# Patient Record
Sex: FEMALE | Race: ASIAN | NOT HISPANIC OR LATINO | Employment: UNEMPLOYED | URBAN - METROPOLITAN AREA
[De-identification: names, ages, dates, MRNs, and addresses within clinical notes are randomized per-mention and may not be internally consistent; named-entity substitution may affect disease eponyms.]

---
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
## 2023-04-20 ENCOUNTER — PATIENT OUTREACH (OUTPATIENT)
Dept: CARE COORDINATION | Facility: CLINIC | Age: 18
End: 2023-04-20
Payer: COMMERCIAL

## 2023-05-04 ENCOUNTER — PATIENT OUTREACH (OUTPATIENT)
Dept: CARE COORDINATION | Facility: CLINIC | Age: 18
End: 2023-05-04
Payer: COMMERCIAL

## 2023-06-02 ENCOUNTER — OFFICE VISIT (OUTPATIENT)
Dept: PEDIATRICS | Facility: CLINIC | Age: 18
End: 2023-06-02
Payer: COMMERCIAL

## 2023-06-02 DIAGNOSIS — F41.9 ANXIETY AND DEPRESSION: ICD-10-CM

## 2023-06-02 DIAGNOSIS — F32.A ANXIETY AND DEPRESSION: ICD-10-CM

## 2023-06-02 DIAGNOSIS — Z22.7 LATENT TUBERCULOSIS: Primary | ICD-10-CM

## 2023-06-02 PROCEDURE — 99213 OFFICE O/P EST LOW 20 MIN: CPT | Performed by: PEDIATRICS

## 2023-06-02 RX ORDER — ISONIAZID 100 MG/1
TABLET ORAL
Qty: 36 TABLET | Refills: 0 | Status: SHIPPED | OUTPATIENT
Start: 2023-06-02 | End: 2024-02-17

## 2023-06-02 ASSESSMENT — PATIENT HEALTH QUESTIONNAIRE - PHQ9
SUM OF ALL RESPONSES TO PHQ QUESTIONS 1-9: 20
SUM OF ALL RESPONSES TO PHQ QUESTIONS 1-9: 20
10. IF YOU CHECKED OFF ANY PROBLEMS, HOW DIFFICULT HAVE THESE PROBLEMS MADE IT FOR YOU TO DO YOUR WORK, TAKE CARE OF THINGS AT HOME, OR GET ALONG WITH OTHER PEOPLE: VERY DIFFICULT

## 2023-06-02 NOTE — PROGRESS NOTES
Assessment & Plan     Latent tuberculosis  - isoniazid (NYDRAZID) 100 MG tablet; Take 3 tablet by mouth once a week for 12 weeks  - rifapentine (PRIFTIN) 150 MG tablet; Take 6 tablets by mouth once a week for 12 weeks  cxr negative, strongly encouraged patient to stay compliant     Spoke to patient at length about her depression and anxiety  - due to cultural issues and finanacial dependence on parents doesn't want to start medications, was seeing a therapist but stopped due to stigma,  Reviewed safety plan with patient, review suicide hotline, offered to speak to patient's parents but patient deferred    Depression Screening Follow Up        6/2/2023    10:41 AM   PHQ   PHQ-9 Total Score 20   Q9: Thoughts of better off dead/self-harm past 2 weeks Nearly every day   F/U: Thoughts of suicide or self-harm Yes   F/U: Self harm-plan Yes   F/U: Self-harm action Yes   F/U: Safety concerns No          Aisha Mohan MD  [facility]   Giuseppe is a 18 year old, presenting for the following health issues:  Results (Positive quantiferon results, 2/1/22)        6/2/2023    10:49 AM   Additional Questions   Roomed by olive   Accompanied by self         6/2/2023    10:49 AM   Patient Reported Additional Medications   Patient reports taking the following new medications none     History of Present Illness       Reason for visit:  Tb  Symptoms include:  None    She eats 0-1 servings of fruits and vegetables daily.She consumes 0 sweetened beverage(s) daily.She exercises with enough effort to increase her heart rate 9 or less minutes per day.  She exercises with enough effort to increase her heart rate 3 or less days per week.   She is taking medications regularly.    Today's PHQ-9         PHQ-9 Total Score: 20    PHQ-9 Q9 Thoughts of better off dead/self-harm past 2 weeks :   Nearly every day  Thoughts of suicide or self harm: (P) Yes  Self-harm Plan:   (P) Yes  Self-harm Action:     (P)  Yes  Safety concerns for self or others: (P) No    How difficult have these problems made it for you to do your work, take care of things at home, or get along with other people: Very difficult  + self harm, spoke to patient at length about depression screen, cultural stigma on mental health conditions, planning to talk to her family about restarting therapy services     + latent tb - cxr negative, [place], but travels to [place] every few years and stays for one month, doesn't know if she was in contact with someone with active TB, never been homeless or been in [place]  Objective    LMP  (LMP Unknown)   There is no height or weight on file to calculate BMI.  Physical Exam   GENERAL: healthy, alert and no distress  NECK: no adenopathy, no asymmetry, masses, or scars and thyroid normal to palpation  RESP: lungs clear to auscultation - no rales, rhonchi or wheezes  CV: regular rate and rhythm, normal S1 S2, no S3 or S4, no murmur, click or rub, no peripheral edema and peripheral pulses strong

## 2023-06-21 ENCOUNTER — OFFICE VISIT (OUTPATIENT)
Dept: PEDIATRICS | Facility: CLINIC | Age: 18
End: 2023-06-21
Payer: COMMERCIAL

## 2023-06-21 VITALS
RESPIRATION RATE: 18 BRPM | OXYGEN SATURATION: 98 % | BODY MASS INDEX: 21.89 KG/M2 | WEIGHT: 128.2 LBS | TEMPERATURE: 98.1 F | HEART RATE: 84 BPM | HEIGHT: 64 IN | DIASTOLIC BLOOD PRESSURE: 75 MMHG | SYSTOLIC BLOOD PRESSURE: 115 MMHG

## 2023-06-21 DIAGNOSIS — R35.0 URINARY FREQUENCY: ICD-10-CM

## 2023-06-21 DIAGNOSIS — N92.6 IRREGULAR PERIODS: Primary | ICD-10-CM

## 2023-06-21 LAB
ALBUMIN UR-MCNC: ABNORMAL MG/DL
AMORPH CRY #/AREA URNS HPF: ABNORMAL /HPF
APPEARANCE UR: CLEAR
BACTERIA #/AREA URNS HPF: ABNORMAL /HPF
BILIRUB UR QL STRIP: ABNORMAL
C TRACH DNA SPEC QL PROBE+SIG AMP: NEGATIVE
COLOR UR AUTO: YELLOW
GLUCOSE UR STRIP-MCNC: NEGATIVE MG/DL
HCG UR QL: NEGATIVE
HGB UR QL STRIP: ABNORMAL
KETONES UR STRIP-MCNC: ABNORMAL MG/DL
LEUKOCYTE ESTERASE UR QL STRIP: NEGATIVE
N GONORRHOEA DNA SPEC QL NAA+PROBE: NEGATIVE
NITRATE UR QL: NEGATIVE
PH UR STRIP: 5.5 [PH] (ref 5–7)
RBC #/AREA URNS AUTO: ABNORMAL /HPF
SP GR UR STRIP: >=1.03 (ref 1–1.03)
SQUAMOUS #/AREA URNS AUTO: ABNORMAL /LPF
UROBILINOGEN UR STRIP-ACNC: 0.2 E.U./DL
WBC #/AREA URNS AUTO: ABNORMAL /HPF

## 2023-06-21 PROCEDURE — 99213 OFFICE O/P EST LOW 20 MIN: CPT | Performed by: PEDIATRICS

## 2023-06-21 PROCEDURE — 87591 N.GONORRHOEAE DNA AMP PROB: CPT | Performed by: PEDIATRICS

## 2023-06-21 PROCEDURE — 87491 CHLMYD TRACH DNA AMP PROBE: CPT | Performed by: PEDIATRICS

## 2023-06-21 PROCEDURE — 81025 URINE PREGNANCY TEST: CPT | Performed by: PEDIATRICS

## 2023-06-21 PROCEDURE — 81001 URINALYSIS AUTO W/SCOPE: CPT | Performed by: PEDIATRICS

## 2023-06-21 RX ORDER — NORETHINDRONE ACETATE AND ETHINYL ESTRADIOL 1MG-20(21)
1 KIT ORAL DAILY
Qty: 30 TABLET | Refills: 3 | Status: SHIPPED | OUTPATIENT
Start: 2023-06-21 | End: 2023-08-28

## 2023-06-21 ASSESSMENT — PAIN SCALES - GENERAL: PAINLEVEL: NO PAIN (0)

## 2023-06-21 NOTE — PROGRESS NOTES
Assessment & Plan     Urinary frequency  ua negative, maybe more pressure like pain when patient was initially constipated  Discolored urine is a side effect of TB medication    Irregular periods  - UA reflex to Microscopic - lab collect; Future  - HCG qualitative urine; Future  - Chlamydia & Gonorrhea by PCR, [facility] - Clinic Collect  - norethindrone-ethinyl estradiol (JUNEL FE 1/20) 1-20 MG-MCG tablet; Take 1 tablet by mouth daily for 120 days  - UA reflex to Microscopic - lab collect  - HCG qualitative urine  - Urine Microscopic Exam  If uhcg negative, can start birth control today    Aisha Mohan MD  [facility]   Giuseppe is a 18 year old, presenting for the following health issues:  UTI and Contraception        6/21/2023     8:39 AM   Additional Questions   Roomed by Qian   Accompanied by Self     UTI  This is a chronic problem. The current episode started more than 1 year ago. The problem occurs constantly. The problem has been unchanged. She has tried drinking for the symptoms. The treatment provided no relief.   Contraception    History of Present Illness       Reason for visit:  Bladder issues n bc  Symptom onset:  More than a month  Symptoms include:  Orange urine, pain with urine  Symptom progression:  Staying the same  Had these symptoms before:  Yes    She eats 0-1 servings of fruits and vegetables daily.She consumes 0 sweetened beverage(s) daily.She exercises with enough effort to increase her heart rate 20 to 29 minutes per day.  She exercises with enough effort to increase her heart rate 3 or less days per week.   She is taking medications regularly.       History of constipation, was due to iron supplements so stopped that, constipation has improved, improved  urinary frequency now  4-5 times a day but was more like 8-10 times a day, occ burning, occ urgency, no incontinence   Lmp: May 31st  Has been on birth control before for period regulation - bleeding  "too much - lasting for month at a time when she initially had period when she was 9, was on hormonal contraception at that time, restarted birth control when she was 15, now is sexually active , 1 partner, would like to start birth control         Objective    /75 (BP Location: Left arm, Patient Position: Sitting, Cuff Size: Child)   Pulse 84   Temp 98.1  F (36.7  C) (Oral)   Resp 18   Ht 1.622 m (5' 3.86\")   Wt 58.2 kg (128 lb 3.2 oz)   LMP 05/31/2023   SpO2 98%   BMI 22.10 kg/m    Body mass index is 22.1 kg/m .  Physical Exam   GENERAL: healthy, alert and no distress  NECK: no adenopathy, no asymmetry, masses, or scars and thyroid normal to palpation  RESP: lungs clear to auscultation - no rales, rhonchi or wheezes  CV: regular rate and rhythm, normal S1 S2, no S3 or S4, no murmur, click or rub, no peripheral edema and peripheral pulses strong  ABDOMEN: soft, nontender, no hepatosplenomegaly, no masses and bowel sounds normal        "

## 2023-07-30 ENCOUNTER — HEALTH MAINTENANCE LETTER (OUTPATIENT)
Age: 18
End: 2023-07-30

## 2023-08-24 NOTE — PATIENT INSTRUCTIONS
If you have any questions regarding your visit, Please contact your care team.     [employer]: [phone]    To Schedule an Appointment 24/7  Call: [phone] TELEPH[facility]     Vincent Howard MD  Medical Director    Domingo Hinton-RUCHI Pedroza-Surgery Scheduler  [place]-Surgery Scheduler               Tuesday-[place]  7:30 a.m-4:30 p.m    Thursday-[place]  7:30 a.m-4:30 p.m    Typical Surgery Days: Tuesday or Friday [facility] [place]  [address] [place] [address]  PH: [phone]     Imaging Scheduling all locations  PH: [phone]     [facility] Labor and Delivery  [address] [place] [address] [place] [address]  PH: [phone]    [facility]  [address] [place] [address]  PH: [phone] Fax      **Surgeries** Our Surgery Schedulers will contact you to schedule. If you do not receive a call within 3 business days, please call [phone].    Urgent Care locations:  [facility] Monday-Friday  10 am - 8 pm  Saturday and Sunday   9 am - 5 pm  Monday-Friday   10 am - 8 pm  Saturday and Sunday   9 am - 5 pm   [phone]   If you need a medication refill, please contact your pharmacy. Please allow 3 business days for your refill to be completed.  As always, Thank you for trusting us with your healthcare needs!    see additional instructions from your care team below

## 2023-08-28 ENCOUNTER — OFFICE VISIT (OUTPATIENT)
Dept: OBGYN | Facility: CLINIC | Age: 18
End: 2023-08-28
Payer: COMMERCIAL

## 2023-08-28 VITALS
BODY MASS INDEX: 23.41 KG/M2 | HEART RATE: 87 BPM | WEIGHT: 135.8 LBS | OXYGEN SATURATION: 99 % | SYSTOLIC BLOOD PRESSURE: 119 MMHG | DIASTOLIC BLOOD PRESSURE: 79 MMHG

## 2023-08-28 DIAGNOSIS — N92.6 ABNORMAL MENSTRUAL CYCLE: Primary | ICD-10-CM

## 2023-08-28 LAB
ESTRADIOL SERPL-MCNC: 68 PG/ML
FSH SERPL IRP2-ACNC: 4.2 MIU/ML
LH SERPL-ACNC: 8.3 MIU/ML
PROGEST SERPL-MCNC: 4.5 NG/ML
PROLACTIN SERPL 3RD IS-MCNC: 34 NG/ML (ref 5–23)
SHBG SERPL-SCNC: 39 NMOL/L (ref 30–135)
TSH SERPL DL<=0.005 MIU/L-ACNC: 2.21 UIU/ML (ref 0.3–4.2)

## 2023-08-28 PROCEDURE — 84443 ASSAY THYROID STIM HORMONE: CPT | Performed by: OBSTETRICS & GYNECOLOGY

## 2023-08-28 PROCEDURE — 83001 ASSAY OF GONADOTROPIN (FSH): CPT | Performed by: OBSTETRICS & GYNECOLOGY

## 2023-08-28 PROCEDURE — 84146 ASSAY OF PROLACTIN: CPT | Performed by: OBSTETRICS & GYNECOLOGY

## 2023-08-28 PROCEDURE — 99204 OFFICE O/P NEW MOD 45 MIN: CPT | Performed by: OBSTETRICS & GYNECOLOGY

## 2023-08-28 PROCEDURE — 84270 ASSAY OF SEX HORMONE GLOBUL: CPT | Performed by: OBSTETRICS & GYNECOLOGY

## 2023-08-28 PROCEDURE — 83002 ASSAY OF GONADOTROPIN (LH): CPT | Performed by: OBSTETRICS & GYNECOLOGY

## 2023-08-28 PROCEDURE — 82670 ASSAY OF TOTAL ESTRADIOL: CPT | Performed by: OBSTETRICS & GYNECOLOGY

## 2023-08-28 PROCEDURE — 36415 COLL VENOUS BLD VENIPUNCTURE: CPT | Performed by: OBSTETRICS & GYNECOLOGY

## 2023-08-28 PROCEDURE — 84144 ASSAY OF PROGESTERONE: CPT | Performed by: OBSTETRICS & GYNECOLOGY

## 2023-08-28 PROCEDURE — 84403 ASSAY OF TOTAL TESTOSTERONE: CPT | Performed by: OBSTETRICS & GYNECOLOGY

## 2023-08-28 NOTE — PROGRESS NOTES
Giuseppe is a 18 year old   is here today complaining of irregular cycles.  This has been a problem for a long time.  She reports having a strong family history of POLYCYSTIC OVARIAN SYNDROME and requests testing.       ROS: Pertinent ROS as above.    Gyn Hx:      Past Medical History:   Diagnosis Date    ADHD (attention deficit hyperactivity disorder)     Anxiety, mild      Past Surgical History:   Procedure Laterality Date    NO HISTORY OF SURGERY           ALL/Meds: Her medication and allergy histories were reviewed and are documented in their appropriate chart areas.    SH: Reviewed and documented in the appropriate area of the chart.  FH:  Her family history is reviewed and updated in the chart, today.  PMH: Her past medical, surgical, and obstetric histories were reviewed and updated today in the appropriate chart areas.    PE: /79 (BP Location: Left arm, Patient Position: Chair, Cuff Size: Adult Regular)   Pulse 87   Wt 61.6 kg (135 lb 12.8 oz)   LMP 2023 (Exact Date)   SpO2 99%   BMI 23.41 kg/m    Body mass index is 23.41 kg/m .    Pertinent Physical exam findings:    General Appearance:  healthy, alert, active, no distress  Discussed at length the diagnosis and treatment plans for polycystic ovary syndrome.  Discussed PCOS as a syndrome that originates with increased insulin resistance which leads to hyperandrogenism, irregular menstruation, and polycystic appearing ovaries and impaired ovulation.  Discussed lifelong implications such as endometrial hyperplasia, lipid abnormalities, diabetes, and increased risk of metabolic syndrome.        A/P:(N92.6) Abnormal menstrual cycle  (primary encounter diagnosis)  Comment: 45 minutes spent on the date of the encounter doing chart review, patient visit, and documentation    Plan: US Pelvic Complete with Transvaginal,         Estradiol, Follicle stimulating hormone,         Luteinizing Hormone, Prolactin, Testosterone         Free and Total, TSH  with free T4 reflex,         Progesterone        She will follow up after the ultrasound to go over everything             - No orders of the defined types were placed in this encounter.

## 2023-08-30 LAB
TESTOST FREE SERPL-MCNC: 0.52 NG/DL
TESTOST SERPL-MCNC: 32 NG/DL (ref 8–60)

## 2023-08-31 ENCOUNTER — ANCILLARY PROCEDURE (OUTPATIENT)
Dept: ULTRASOUND IMAGING | Facility: CLINIC | Age: 18
End: 2023-08-31
Attending: OBSTETRICS & GYNECOLOGY
Payer: COMMERCIAL

## 2023-08-31 DIAGNOSIS — Z22.7 LATENT TUBERCULOSIS: ICD-10-CM

## 2023-08-31 DIAGNOSIS — N92.6 ABNORMAL MENSTRUAL CYCLE: ICD-10-CM

## 2023-08-31 PROCEDURE — 76830 TRANSVAGINAL US NON-OB: CPT | Mod: TC | Performed by: RADIOLOGY

## 2023-08-31 PROCEDURE — 76856 US EXAM PELVIC COMPLETE: CPT | Mod: TC | Performed by: RADIOLOGY

## 2023-09-01 DIAGNOSIS — R79.89 ELEVATED PROLACTIN LEVEL: Primary | ICD-10-CM

## 2023-09-03 NOTE — TELEPHONE ENCOUNTER
Haven't seen patient > I year. Will forward to provider that last prescribed it to refill if appropriate.

## 2023-09-05 RX ORDER — ISONIAZID 100 MG/1
TABLET ORAL
Qty: 36 TABLET | Refills: 0 | OUTPATIENT
Start: 2023-09-05

## 2023-09-28 NOTE — PATIENT INSTRUCTIONS
If you have any questions regarding your visit, Please contact your care team.     [employer]: [phone]    To Schedule an Appointment 24/7  Call: [phone] HOU[facility] TELEPHONE NUMBER     Vincent Howard MD  Medical Director    Domingo Hinton-RUCHI Pedroza-Surgery Scheduler  [place]-Surgery Scheduler               Tuesday-[place]  7:30 a.m-4:30 p.m    Thursday-[place]  7:30 a.m-4:30 p.m    Typical Surgery Days: Tuesday or Friday [facility] [place]  [address] [place] [address]  PH: [phone]     Imaging Scheduling all locations  PH: [phone]     [facility] Labor and Delivery  [address] [place] [address] [place] [address]  PH: [phone]    [facility]  [address] [place] [address]  PH: [phone] Fax      **Surgeries** Our Surgery Schedulers will contact you to schedule. If you do not receive a call within 3 business days, please call [phone].    Urgent Care locations:  [facility] Monday-Friday  10 am - 8 pm  Saturday and Sunday   9 am - 5 pm  Monday-Friday   10 am - 8 pm  Saturday and Sunday   9 am - 5 pm   [phone]   If you need a medication refill, please contact your pharmacy. Please allow 3 business days for your refill to be completed.  As always, Thank you for trusting us with your healthcare needs!    see additional instructions from your care team below

## 2023-10-02 ENCOUNTER — VIRTUAL VISIT (OUTPATIENT)
Dept: OBGYN | Facility: CLINIC | Age: 18
End: 2023-10-02
Payer: COMMERCIAL

## 2023-10-02 DIAGNOSIS — E28.2 PCOS (POLYCYSTIC OVARIAN SYNDROME): Primary | ICD-10-CM

## 2023-10-02 PROCEDURE — 99213 OFFICE O/P EST LOW 20 MIN: CPT | Mod: VID | Performed by: OBSTETRICS & GYNECOLOGY

## 2023-10-02 NOTE — PROGRESS NOTES
Giuseppe is a 18 year old who is being evaluated via a billable video visit.      How would you like to obtain your AVS? MyChart  If the video visit is dropped, the invitation should be resent by:   Will anyone else be joining your video visit? No      Start time 10:10    Assessment & Plan   Problem List Items Addressed This Visit          Medium    PCOS (polycystic ovarian syndrome) - Primary          20 minutes spent by me on the date of the encounter doing chart review, history and exam, documentation and further activities per the note       Nicotine/Tobacco Cessation:  She reports that she has been smoking cigarettes and vaping device. She has never used smokeless tobacco.  Nicotine/Tobacco Cessation Plan:             No follow-ups on file.    Vincent Howard MD  [facility]   Giuseppe is a 18 year old, presenting for the following health issues:  Consult        6/21/2023     8:39 AM   Additional Questions   Roomed by Qian   Accompanied by Self       HPI Patient called to discuss test results  Reviewed her lab and ultrasound results consistent with POLYCYSTIC OVARIAN SYNDROME.            Review of Systems         Objective           Vitals:  No vitals were obtained today due to virtual visit.    Physical Exam       Discussed treatment options, based on desired outcome.  Include menstrual cycle management as well as reduction in insulin levels with metformin.  Also management options for fertility were also discussed    End time 10:25        Video-Visit Details    Type of service:  Video Visit     Originating Location (pt. Location): Home    Distant Location (provider location):  On-site  Platform used for Video Visit: [email or web address]

## 2023-10-15 NOTE — PATIENT INSTRUCTIONS
"1 month tele follow up- PHQ-9/STACI 7    Preventive Care at the 15 - 18 Year Visit    Growth Percentiles & Measurements   Weight: 128 lbs 0 oz / 58.1 kg (actual weight) / 66 %ile based on CDC (Girls, 2-20 Years) weight-for-age data based on Weight recorded on 2/19/2019.   Length: 5' 3.25\" / 160.7 cm 38 %ile based on CDC (Girls, 2-20 Years) Stature-for-age data based on Stature recorded on 2/19/2019.   BMI: Body mass index is 22.5 kg/m . 72 %ile based on CDC (Girls, 2-20 Years) BMI-for-age based on body measurements available as of 2/19/2019.     Next Visit    Continue to see your health care provider every year for preventive care.    Nutrition    It s very important to eat breakfast. This will help you make it through the morning.    Sit down with your family for a meal on a regular basis.    Eat healthy meals and snacks, including fruits and vegetables. Avoid salty and sugary snack foods.    Be sure to eat foods that are high in calcium and iron.    Avoid or limit caffeine (often found in soda pop).    Sleeping    Your body needs about 9 hours of sleep each night.    Keep screens (TV, computer, and video) out of the bedroom / sleeping area.  They can lead to poor sleep habits and increased obesity.    Health    Limit TV, computer and video time.    Set a goal to be physically fit.  Do some form of exercise every day.  It can be an active sport like skating, running, swimming, a team sport, etc.    Try to get 30 to 60 minutes of exercise at least three times a week.    Make healthy choices: don t smoke or drink alcohol; don t use drugs.    In your teen years, you can expect . . .    To develop or strengthen hobbies.    To build strong friendships.    To be more responsible for yourself and your actions.    To be more independent.    To set more goals for yourself.    To use words that best express your thoughts and feelings.    To develop self-confidence and a sense of self.    To make choices about your education " and future career.    To see big differences in how you and your friends grow and develop.    To have body odor from perspiration (sweating).  Use underarm deodorant each day.    To have some acne, sometimes or all the time.  (Talk with your doctor or nurse about this.)    Most girls have finished going through puberty by 15 to 16 years. Often, boys are still growing and building muscle mass.    Sexuality    It is normal to have sexual feelings.    Find a supportive person who can answer questions about puberty, sexual development, sex, abstinence (choosing not to have sex), sexually transmitted diseases (STDs) and birth control.    Think about how you can say no to sex.    Safety    Accidents are the greatest threat to your health and life.    Avoid dangerous behaviors and situations.  For example, never drive after drinking or using drugs.  Never get in a car if the  has been drinking or using drugs.    Always wear a seat belt in the car.  When you drive, make it a rule for all passengers to wear seat belts, too.    Stay within the speed limit and avoid distractions.    Practice a fire escape plan at home. Check smoke detector batteries twice a year.    Keep electric items (like blow dryers, razors, curling irons, etc.) away from water.    Wear a helmet and other protective gear when bike riding, skating, skateboarding, etc.    Use sunscreen to reduce your risk of skin cancer.    Learn first aid and CPR (cardiopulmonary resuscitation).    Avoid peers who try to pressure you into risky activities.    Learn skills to manage stress, anger and conflict.    Do not use or carry any kind of weapon.    Find a supportive person (teacher, parent, health provider, counselor) whom you can talk to when you feel sad, angry, lonely or like hurting yourself.    Find help if you are being abused physically or sexually, or if you fear being hurt by others.    As a teenager, you will be given more responsibility for your  health and health care decisions.  While your parent or guardian still has an important role, you will likely start spending some time alone with your health care provider as you get older.  Some teen health issues are actually considered confidential, and are protected by law.  Your health care team will discuss this and what it means with you.  Our goal is for you to become comfortable and confident caring for your own health.  ================================================================   Principal Discharge DX:	Chest pain   1

## 2024-02-15 ENCOUNTER — NURSE TRIAGE (OUTPATIENT)
Dept: FAMILY MEDICINE | Facility: CLINIC | Age: 19
End: 2024-02-15
Payer: COMMERCIAL

## 2024-02-15 NOTE — TELEPHONE ENCOUNTER
"Pt calling. States she has had a cold since 1/20. Cold symptoms have mostly subsided. Has had an ear infection in her right ear since 1/30. Hearing has not returned. Feels congested and can hear fluid when she is swallowing. Has intermittent pinching pain or \"ear infection itch\". Pt was evaluated outside of [place] and was prescribed Amoxiciliin for 5 days and ear drops. Pt finished medications 1-2 weeks ago. No ear pain currently. Sometimes get's a little scratch in her throat. Is sneezing at times. No fever. Does have congestion in her sinuses. When she lies down in bed has throat pain. Per protocol, see today or tomorrow in office. RN offered appt for today and pt declined. Appt scheduled for tomorrow.     Halina Edmonds RN  [facility] [place]      Reason for Disposition   All other earaches  (Exceptions: Earache lasting < 1 hour, and earache from air travel.)    Additional Information   Negative: Sounds like a life-threatening emergency to the triager   Negative: Moving the earlobe or touching the ear clearly increases the pain   Negative: Foreign body stuck in the ear (e.g., bug, piece of cotton)   Negative: Pink or red swelling behind the ear   Negative: Stiff neck (can't touch chin to chest)   Negative: Patient sounds very sick or weak to the triager   Negative: Severe earache pain   Negative: Fever > 103 F (39.4 C)   Negative: Pointed object was inserted into the ear canal (e.g., a pencil, stick, or wire)   Negative: White, yellow, or green discharge   Negative: Diabetes mellitus or a weak immune system (e.g., HIV positive, cancer chemotherapy, transplant patient)   Negative: Bloody discharge or unexplained bleeding from ear canal   Negative: New blurred vision or vision changes    Protocols used: Earache-A-[place]    "

## 2024-02-16 ENCOUNTER — OFFICE VISIT (OUTPATIENT)
Dept: FAMILY MEDICINE | Facility: CLINIC | Age: 19
End: 2024-02-16
Payer: COMMERCIAL

## 2024-02-16 VITALS
RESPIRATION RATE: 18 BRPM | BODY MASS INDEX: 23.7 KG/M2 | HEART RATE: 87 BPM | OXYGEN SATURATION: 100 % | DIASTOLIC BLOOD PRESSURE: 80 MMHG | WEIGHT: 138.8 LBS | SYSTOLIC BLOOD PRESSURE: 120 MMHG | HEIGHT: 64 IN | TEMPERATURE: 97.9 F

## 2024-02-16 DIAGNOSIS — H66.91 RIGHT ACUTE OTITIS MEDIA: Primary | ICD-10-CM

## 2024-02-16 PROCEDURE — 99213 OFFICE O/P EST LOW 20 MIN: CPT | Performed by: PHYSICIAN ASSISTANT

## 2024-02-16 ASSESSMENT — PAIN SCALES - GENERAL: PAINLEVEL: NO PAIN (0)

## 2024-02-16 NOTE — PROGRESS NOTES
Assessment & Plan   Problem List Items Addressed This Visit    None  Visit Diagnoses       Right acute otitis media    -  Primary    Relevant Medications    amoxicillin-clavulanate (AUGMENTIN) 875-125 MG tablet           Patient presents with over two weeks of intermittent mild right ear pain, diminished hearing in the right ear, and a sore throat. Unsure of what may be causing patient's symptoms, but differentials include acute otitis media, otitis externa, eustachian tube dysfunction, sinus infection, strep throat. On exam, right ear canal is erythematous, right tympanic membrane is erythematous and slightly bulged, and bilateral tonsils are enlarged and mildly erythematous. Low suspicion for otitis externa as right ear was not painful to touch. Do not suspect patient has a sinus infection as she is not experiencing significant sinus congestion/pain at this time. Impression is likely acute otitis media with possible strep or viral pharyngitis. Patient to begin course of augmentin as indicated above. No need to complete strep swab today given that augmentin would cover for possible strep infection and results would not change course. Encouraged patient to continue tylenol/ibuprofen as needed, begin sudafed to promote fluid drainage from eustachian tubes, and push p.o. fluids. Reviewed OTC options for ear wax removal for left ear. Follow up in 1-2 weeks if symptoms if symptoms are not improving or go to urgent care/emergency department with any changing or worsening symptoms.     Complete history and physical exam as below. Afebrile with normal vital signs.    DDx and Dx discussed with and explained to the pt to their satisfaction.  All questions were answered at this time. Pt expressed understanding of and agreement with this dx, tx, and plan. No further workup warranted and standard medication warnings given. I have given the patient a list of pertinent indications for re-evaluation. Will go to the Emergency  Department if symptoms worsen or new concerning symptoms arise. Patient left in no apparent distress.     Prescription drug management     See Patient Instructions      Subjective   Giuseppe is a 19 year old, presenting for the following health issues:  Ear Problem and Nasal Congestion        2/16/2024     2:53 PM   Additional Questions   Roomed by Herson Kumar CMA   Accompanied by N/A         2/16/2024     2:53 PM   Patient Reported Additional Medications   Patient reports taking the following new medications No new medications     History of Present Illness       Reason for visit:  Sick ear infection cant hear  Symptom onset:  3-4 weeks ago  Symptoms include:  Sore thriat congestion ear pain cant hear  Symptom intensity:  Moderate  Symptom progression:  Improving  Had these symptoms before:  Yes  Has tried/received treatment for these symptoms:  Yes  Previous treatment was successful:  No    She eats 2-3 servings of fruits and vegetables daily.She consumes 0 sweetened beverage(s) daily.She exercises with enough effort to increase her heart rate 9 or less minutes per day.  She exercises with enough effort to increase her heart rate 3 or less days per week.   She is taking medications regularly.     Patient presents for evaluation of ear discomfort. She states that she was seen on 1/30/24 for evaluation of ear pain at which time she was diagnosed with an ear infection and was prescribed a course of amoxicillin and antibiotic ear drops. Since then, her symptoms have significantly improved but she continues to have intermittent ear pain/pressure and diminished hearing. She is not currently using anything to treat her symptoms. She also notes that she has a slightly sore throat that is more painful when she is laying down. She has mild nasal congestion but denies fevers, chills, nausea, vomiting, or diarrhea.     Review of Systems  Constitutional, HEENT, cardiovascular, pulmonary, gi and gu systems are negative, except  "as otherwise noted.      Objective    /80   Pulse 87   Temp 97.9  F (36.6  C) (Temporal)   Resp 18   Ht 1.62 m (5' 3.78\")   Wt 63 kg (138 lb 12.8 oz)   LMP 01/27/2024 (Exact Date)   SpO2 100%   BMI 23.99 kg/m    Body mass index is 23.99 kg/m .  Physical Exam   GENERAL: alert and no distress  EYES: Eyes grossly normal to inspection, PERRL and conjunctivae and sclerae normal  HENT: normal cephalic/atraumatic, left ear canal occluded with wax, right ear canal erythematous with erythematous TM, bilateral tonsillar erythema and enlargement, no tonsillar exudate, nose and mouth without ulcers or lesions, oropharynx clear, oral mucous membranes moist  NECK: no adenopathy, no asymmetry, masses, or scars  RESP: lungs clear to auscultation - no rales, rhonchi or wheezes  CV: regular rate and rhythm, normal S1 S2, no S3 or S4, no murmur, click or rub, no peripheral edema       Signed Electronically by: TRUDI Grier    " No indicators present

## 2024-02-17 NOTE — PATIENT INSTRUCTIONS
Kerry Chin,    Thank you for allowing [facility] to manage your care.    You appear to have residual ear infection. Take the antibiotics as prescribed.    If you develop worsening/changing symptoms at any time, please be seen in clinic/urgent care or call 911/go to the emergency department for evaluation.    I sent your prescriptions to your pharmacy.    If you have any questions or concerns, please feel free to call us at [phone]    Sincerely,    Mike Miguel PA-C    Did you know?      You can schedule a video visit for follow-up appointments as well as future appointments for certain conditions.  Please see the below link.     [email or web address]    If you have not already done so,  I encourage you to sign up for [employer] ([email or web address]).  This will allow you to review your results, securely communicate with a provider, and schedule virtual visits as well.

## 2024-05-15 ENCOUNTER — TELEPHONE (OUTPATIENT)
Dept: PEDIATRICS | Facility: CLINIC | Age: 19
End: 2024-05-15
Payer: COMMERCIAL

## 2024-05-15 NOTE — TELEPHONE ENCOUNTER
Patient Quality Outreach    Patient is due for the following:   Cervical Cancer Screening - PAP Needed  Physical Preventive Adult Physical    Next Steps:   Schedule a Adult Preventative    Type of outreach:    Sent [employer] message.      Questions for provider review:    None           Ashley Espinosa MA

## 2024-08-22 ENCOUNTER — OFFICE VISIT (OUTPATIENT)
Dept: FAMILY MEDICINE | Facility: CLINIC | Age: 19
End: 2024-08-22
Payer: COMMERCIAL

## 2024-08-22 VITALS
HEIGHT: 63 IN | OXYGEN SATURATION: 99 % | WEIGHT: 135 LBS | RESPIRATION RATE: 16 BRPM | BODY MASS INDEX: 23.92 KG/M2 | TEMPERATURE: 98.1 F | DIASTOLIC BLOOD PRESSURE: 84 MMHG | SYSTOLIC BLOOD PRESSURE: 123 MMHG | HEART RATE: 100 BPM

## 2024-08-22 DIAGNOSIS — L85.3 DRY SKIN: ICD-10-CM

## 2024-08-22 DIAGNOSIS — Z00.00 ROUTINE GENERAL MEDICAL EXAMINATION AT A HEALTH CARE FACILITY: Primary | ICD-10-CM

## 2024-08-22 DIAGNOSIS — R79.89 ELEVATED PROLACTIN LEVEL: ICD-10-CM

## 2024-08-22 DIAGNOSIS — Z86.2 HISTORY OF ANEMIA: ICD-10-CM

## 2024-08-22 LAB
BASOPHILS # BLD AUTO: 0 10E3/UL (ref 0–0.2)
BASOPHILS NFR BLD AUTO: 0 %
EOSINOPHIL # BLD AUTO: 0.1 10E3/UL (ref 0–0.7)
EOSINOPHIL NFR BLD AUTO: 1 %
ERYTHROCYTE [DISTWIDTH] IN BLOOD BY AUTOMATED COUNT: 13 % (ref 10–15)
FERRITIN SERPL-MCNC: 82 NG/ML (ref 6–175)
HCG INTACT+B SERPL-ACNC: <1 MIU/ML
HCG SERPL QL: NEGATIVE
HCG UR QL: NEGATIVE
HCT VFR BLD AUTO: 38.9 % (ref 35–47)
HGB BLD-MCNC: 12.8 G/DL (ref 11.7–15.7)
IMM GRANULOCYTES # BLD: 0 10E3/UL
IMM GRANULOCYTES NFR BLD: 0 %
IRON BINDING CAPACITY (ROCHE): 318 UG/DL (ref 240–430)
IRON SATN MFR SERPL: 49 % (ref 15–46)
IRON SERPL-MCNC: 156 UG/DL (ref 37–145)
LYMPHOCYTES # BLD AUTO: 2.9 10E3/UL (ref 0.8–5.3)
LYMPHOCYTES NFR BLD AUTO: 26 %
MCH RBC QN AUTO: 26.9 PG (ref 26.5–33)
MCHC RBC AUTO-ENTMCNC: 32.9 G/DL (ref 31.5–36.5)
MCV RBC AUTO: 82 FL (ref 78–100)
MONOCYTES # BLD AUTO: 0.8 10E3/UL (ref 0–1.3)
MONOCYTES NFR BLD AUTO: 7 %
NEUTROPHILS # BLD AUTO: 7.2 10E3/UL (ref 1.6–8.3)
NEUTROPHILS NFR BLD AUTO: 66 %
PLATELET # BLD AUTO: 313 10E3/UL (ref 150–450)
PROLACTIN SERPL 3RD IS-MCNC: 21 NG/ML (ref 5–23)
RBC # BLD AUTO: 4.76 10E6/UL (ref 3.8–5.2)
WBC # BLD AUTO: 11 10E3/UL (ref 4–11)

## 2024-08-22 PROCEDURE — 83540 ASSAY OF IRON: CPT | Performed by: FAMILY MEDICINE

## 2024-08-22 PROCEDURE — 99214 OFFICE O/P EST MOD 30 MIN: CPT | Mod: 25 | Performed by: FAMILY MEDICINE

## 2024-08-22 PROCEDURE — 83550 IRON BINDING TEST: CPT | Performed by: FAMILY MEDICINE

## 2024-08-22 PROCEDURE — 36415 COLL VENOUS BLD VENIPUNCTURE: CPT | Performed by: FAMILY MEDICINE

## 2024-08-22 PROCEDURE — 84703 CHORIONIC GONADOTROPIN ASSAY: CPT | Performed by: FAMILY MEDICINE

## 2024-08-22 PROCEDURE — 85025 COMPLETE CBC W/AUTO DIFF WBC: CPT | Performed by: FAMILY MEDICINE

## 2024-08-22 PROCEDURE — 81025 URINE PREGNANCY TEST: CPT | Performed by: FAMILY MEDICINE

## 2024-08-22 PROCEDURE — 84702 CHORIONIC GONADOTROPIN TEST: CPT | Performed by: FAMILY MEDICINE

## 2024-08-22 PROCEDURE — 99395 PREV VISIT EST AGE 18-39: CPT | Performed by: FAMILY MEDICINE

## 2024-08-22 PROCEDURE — 82728 ASSAY OF FERRITIN: CPT | Performed by: FAMILY MEDICINE

## 2024-08-22 PROCEDURE — 84146 ASSAY OF PROLACTIN: CPT | Performed by: FAMILY MEDICINE

## 2024-08-22 RX ORDER — AMMONIUM LACTATE 12 G/100G
LOTION TOPICAL 2 TIMES DAILY
Qty: 225 G | Refills: 1 | Status: SHIPPED | OUTPATIENT
Start: 2024-08-22

## 2024-08-22 SDOH — HEALTH STABILITY: PHYSICAL HEALTH: ON AVERAGE, HOW MANY MINUTES DO YOU ENGAGE IN EXERCISE AT THIS LEVEL?: 30 MIN

## 2024-08-22 SDOH — HEALTH STABILITY: PHYSICAL HEALTH: ON AVERAGE, HOW MANY DAYS PER WEEK DO YOU ENGAGE IN MODERATE TO STRENUOUS EXERCISE (LIKE A BRISK WALK)?: 3 DAYS

## 2024-08-22 ASSESSMENT — SOCIAL DETERMINANTS OF HEALTH (SDOH): HOW OFTEN DO YOU GET TOGETHER WITH FRIENDS OR RELATIVES?: TWICE A WEEK

## 2024-08-22 ASSESSMENT — PAIN SCALES - GENERAL: PAINLEVEL: MODERATE PAIN (4)

## 2024-08-22 NOTE — PROGRESS NOTES
Preventive Care Visit  [facility]  Khris Singletary MD, Family Medicine  Aug 22, 2024      Assessment & Plan     Routine general medical examination at a health care facility  Preventive care reviewed and updated.    - HCG qualitative; Future  - HCG quantitative pregnancy; Future  - HCG qualitative  - HCG quantitative pregnancy  - HCG qualitative urine; Future  - HCG qualitative urine    Dry skin  Located on the palm of both hands.  Likely hand eczema.  - ammonium lactate (LAC-HYDRIN) 12 % external lotion; Apply topically 2 times daily.    History of anemia    - CBC with Platelets & Differential; Future  - Ferritin; Future  - Iron and iron binding capacity; Future  - CBC with Platelets & Differential  - Ferritin  - Iron and iron binding capacity    Follows with OB/GYN -she was diagnosed with PCOS.  The following blood work was ordered laboratory  Elevated prolactin level    - Prolactin        Counseling  Appropriate preventive services were addressed with this patient via screening, questionnaire, or discussion as appropriate for fall prevention, nutrition, physical activity, Tobacco-use cessation, social engagement, weight loss and cognition.  Checklist reviewing preventive services available has been given to the patient.  Reviewed patient's diet, addressing concerns and/or questions.   She is at risk for lack of exercise and has been provided with information to increase physical activity for the benefit of her well-being.   The patient was instructed to see the dentist every 6 months.       Ashtyn Chin is a 19 year old, presenting for the following:  Physical        8/22/2024     2:11 PM   Additional Questions   Roomed by Cris Chin JONATHAN Young is a 19 year old female who presents today for preventive care     Going to  in sept[place] to study abroad for a year   Preventive -     Immunization History   Administered Date(s) Administered    COVID-19 Bivalent 18+ (Moderna)  "11/23/2022    COVID-19 MONOVALENT 12+ (Pfizer) 11/13/2021    COVID-19 Vaccine (Flo) 04/07/2021    Comvax (HIB/HepB) 2003, 2003, 05/07/2004    DTAP (<7y) 2003, 2003, 2003, 05/07/2004, 01/18/2008    HEPATITIS A (PEDS 12M-18Y) 03/18/2008, 12/02/2009    HPV Quadrivalent 05/13/2015, 02/19/2019    Hepatitis B, Peds 2003    Influenza (H1N1) 12/17/2009, 03/29/2010    Influenza (IIV3) PF 2003, 2003, 11/04/2004, 10/19/2005, 11/06/2006    Influenza Intranasal Vaccine 09/30/2009, 10/25/2010    Influenza Vaccine >6 months,quad, PF 10/16/2014, 11/14/2018, 10/31/2019, 12/01/2020, 03/05/2024    Influenza Vaccine, 6+MO IM (QUADRIVALENT W/PRESERVATIVES) 11/01/2020, 11/13/2021    Influenza, seasonal, injectable, PF 11/16/2007, 11/03/2008, 11/16/2011, 10/13/2012    MMR 01/26/2004, 06/09/2006    Meningococcal ACWY (Menactra ) 03/18/2008, 05/13/2015, 02/19/2019    Pneumococcal (PCV 7) 2003, 2003, 2003    Poliovirus, inactivated (IPV) 2003, 2003, 2003, 05/07/2004, 01/18/2008, 11/06/2014    TDAP Vaccine (Adacel) 05/13/2015    Typhoid IM 03/18/2008, 11/06/2014, 11/23/2022    Varicella 01/26/2004, 01/18/2008     -Mammogram: not indicated     -Contraception: spermicide and condoms     -PAP: Declined Pap smear, she would like to schedule with female provider -will reschedule with a female provider     No results found for: \"PAP\"    - Colon CA screen: Colonoscopy, age 45-75 every 10 years or FIT every year or Cologuard every 3 years   No fam hx of colon cancer         Meeting with psyc       8/22/2024   General Health   How would you rate your overall physical health? (!) POOR   Feel stress (tense, anxious, or unable to sleep) To some extent      (!) STRESS CONCERN      8/22/2024   Nutrition   Three or more servings of calcium each day? (!) NO   Diet: Regular (no restrictions)   How many servings of fruit and vegetables per day? (!) 0-1   How many sweetened " beverages each day? 0-1            8/22/2024   Exercise   Days per week of moderate/strenous exercise 3 days   Average minutes spent exercising at this level 30 min            8/22/2024   Social Factors   Frequency of gathering with friends or relatives Twice a week   Worry food won't last until get money to buy more No   Food not last or not have enough money for food? No   Do you have housing? (Housing is defined as stable permanent housing and does not include staying ouside in a car, in a tent, in an abandoned building, in an overnight shelter, or couch-surfing.) Yes   Are you worried about losing your housing? No   Lack of transportation? No   Unable to get utilities (heat,electricity)? No            8/22/2024   Dental   Dentist two times every year? (!) NO            8/22/2024   TB Screening   Were you born outside of the [place]? No              Today's PHQ-2 Score:       2/16/2024     2:21 PM   PHQ-2 ( 1999 Pfizer)   Q1: Little interest or pleasure in doing things 1   Q2: Feeling down, depressed or hopeless 1   PHQ-2 Score 2   Q1: Little interest or pleasure in doing things Several days   Q2: Feeling down, depressed or hopeless Several days   PHQ-2 Score 2         8/22/2024   Substance Use   Alcohol more than 3/day or more than 7/wk Not Applicable   Do you use any other substances recreationally? No        Social History     Tobacco Use    Smoking status: Former     Types: Cigarettes     Passive exposure: Past    Smokeless tobacco: Never   Vaping Use    Vaping status: Former   Substance Use Topics    Alcohol use: No    Drug use: No           8/22/2024   STI Screening   New sexual partner(s) since last STI/HIV test? (!) YES         History of abnormal Pap smear: No - age 21-29 PAP every 3 years recommended             8/22/2024   Contraception/Family Planning   Questions about contraception or family planning (!) YES            Reviewed and updated as needed this visit by Provider       Med Hx  Surg Hx  Fam Hx             Past Medical History:   Diagnosis Date    ADHD (attention deficit hyperactivity disorder)     Anxiety, mild     PCOS (polycystic ovarian syndrome)      Past Surgical History:   Procedure Laterality Date    BIOPSY OF UTERUS LINING      NO HISTORY OF SURGERY       Lab work is in process  BP Readings from Last 3 Encounters:   08/22/24 123/84   02/16/24 120/80   08/28/23 119/79    Wt Readings from Last 3 Encounters:   08/22/24 61.2 kg (135 lb)   02/16/24 63 kg (138 lb 12.8 oz)   08/28/23 61.6 kg (135 lb 12.8 oz)                  Patient Active Problem List   Diagnosis    Family history of PCOS    Abnormal bleeding in menstrual cycle    Adjustment disorder with mixed anxiety and depressed mood    ADHD (attention deficit hyperactivity disorder)    Anxiety and depression    Passive suicidal ideations    PCOS (polycystic ovarian syndrome)     Past Surgical History:   Procedure Laterality Date    BIOPSY OF UTERUS LINING      NO HISTORY OF SURGERY         Social History     Tobacco Use    Smoking status: Former     Types: Cigarettes     Passive exposure: Past    Smokeless tobacco: Never   Substance Use Topics    Alcohol use: No     Family History   Problem Relation Age of Onset    Polycystic ovary syndrome Mother     Diabetes Mother     Diabetes Father     Hypertension Father     Hyperlipidemia Father     Glaucoma Maternal Grandfather     Parkinsonism Maternal Grandfather     Coronary Artery Disease Early Onset Paternal Uncle         in his 40s    Macular Degeneration No family hx of     Breast Cancer No family hx of     Colon Cancer No family hx of          Current Outpatient Medications   Medication Sig Dispense Refill    ammonium lactate (LAC-HYDRIN) 12 % external lotion Apply topically 2 times daily. 225 g 1     No Known Allergies  Recent Labs   Lab Test 08/28/23  0855 02/12/21  1351   LDL  --  87   HDL  --  62   TRIG  --  103*   ALT  --  29   CR  --  0.62   GFRESTIMATED  --  >90   GFRESTBLACK  --  >90  "  POTASSIUM  --  4.5   TSH 2.21 0.63          Review of Systems  Constitutional, HEENT, cardiovascular, pulmonary, gi and gu systems are negative, except as otherwise noted.     Objective    Exam  /84   Pulse 100   Temp 98.1  F (36.7  C) (Oral)   Resp 16   Ht 1.6 m (5' 3\")   Wt 61.2 kg (135 lb)   LMP 08/01/2024 (Exact Date)   SpO2 99%   BMI 23.91 kg/m     Estimated body mass index is 23.91 kg/m  as calculated from the following:    Height as of this encounter: 1.6 m (5' 3\").    Weight as of this encounter: 61.2 kg (135 lb).    Physical Exam  GENERAL: alert and no distress  NECK: no adenopathy, no asymmetry, masses, or scars  RESP: lungs clear to auscultation - no rales, rhonchi or wheezes  CV: regular rate and rhythm, normal S1 S2, no S3 or S4, no murmur, click or rub, no peripheral edema  ABDOMEN: soft, nontender, no hepatosplenomegaly, no masses and bowel sounds normal  MS: no gross musculoskeletal defects noted, no edema        Signed Electronically by: Khris Singletary MD    "

## 2024-08-22 NOTE — PATIENT INSTRUCTIONS
Patient Education   Preventive Care Advice   This is general advice given by our system to help you stay healthy. However, your care team may have specific advice just for you. Please talk to your care team about your preventive care needs.  Nutrition  Eat 5 or more servings of fruits and vegetables each day.  Try wheat bread, brown rice and whole grain pasta (instead of white bread, rice, and pasta).  Get enough calcium and vitamin D. Check the label on foods and aim for 100% of the RDA (recommended daily allowance).  Lifestyle  Exercise at least 150 minutes each week  (30 minutes a day, 5 days a week).  Do muscle strengthening activities 2 days a week. These help control your weight and prevent disease.  No smoking.  Wear sunscreen to prevent skin cancer.  Have a dental exam and cleaning every 6 months.  Yearly exams  See your health care team every year to talk about:  Any changes in your health.  Any medicines your care team has prescribed.  Preventive care, family planning, and ways to prevent chronic diseases.  Shots (vaccines)   HPV shots (up to age 26), if you've never had them before.  Hepatitis B shots (up to age 59), if you've never had them before.  COVID-19 shot: Get this shot when it's due.  Flu shot: Get a flu shot every year.  Tetanus shot: Get a tetanus shot every 10 years.  Pneumococcal, hepatitis A, and RSV shots: Ask your care team if you need these based on your risk.  Shingles shot (for age 50 and up)  General health tests  Diabetes screening:  Starting at age 35, Get screened for diabetes at least every 3 years.  If you are younger than age 35, ask your care team if you should be screened for diabetes.  Cholesterol test: At age 39, start having a cholesterol test every 5 years, or more often if advised.  Bone density scan (DEXA): At age 50, ask your care team if you should have this scan for osteoporosis (brittle bones).  Hepatitis C: Get tested at least once in your life.  STIs (sexually  transmitted infections)  Before age 24: Ask your care team if you should be screened for STIs.  After age 24: Get screened for STIs if you're at risk. You are at risk for STIs (including HIV) if:  You are sexually active with more than one person.  You don't use condoms every time.  You or a partner was diagnosed with a sexually transmitted infection.  If you are at risk for HIV, ask about PrEP medicine to prevent HIV.  Get tested for HIV at least once in your life, whether you are at risk for HIV or not.  Cancer screening tests  Cervical cancer screening: If you have a cervix, begin getting regular cervical cancer screening tests starting at age 21.  Breast cancer scan (mammogram): If you've ever had breasts, begin having regular mammograms starting at age 40. This is a scan to check for breast cancer.  Colon cancer screening: It is important to start screening for colon cancer at age 45.  Have a colonoscopy test every 10 years (or more often if you're at risk) Or, ask your provider about stool tests like a FIT test every year or Cologuard test every 3 years.  To learn more about your testing options, visit:   .  For help making a decision, visit:   https://bit.ly/gm27096.  Prostate cancer screening test: If you have a prostate, ask your care team if a prostate cancer screening test (PSA) at age 55 is right for you.  Lung cancer screening: If you are a current or former smoker ages 50 to 80, ask your care team if ongoing lung cancer screenings are right for you.  For informational purposes only. Not to replace the advice of your health care provider. Copyright   2023 [facility]. All rights reserved. Clinically reviewed by the [facility] Transitions Program. [employer] 386382 - REV 01/24.  Safer Sex: Care Instructions  Overview  Safer sex is a way to reduce your risk of getting a sexually transmitted infection (STI). It can also help prevent pregnancy.  Several products can help you practice  safer sex and reduce your chance of STIs. One of the best is a condom. There are internal and external condoms. You can use a special rubber sheet (dental dam) for protection during oral sex. Disposable gloves can keep your hands from touching blood, semen, or other body fluids that can carry infections.  Remember that birth control methods such as diaphragms, IUDs, foams, and birth control pills do not stop you from getting STIs.  Follow-up care is a key part of your treatment and safety. Be sure to make and go to all appointments, and call your doctor if you are having problems. It's also a good idea to know your test results and keep a list of the medicines you take.  How can you care for yourself at home?  Think about getting vaccinated to help prevent hepatitis A, hepatitis B, and human papillomavirus (HPV). They can be spread through sex.  Use a condom every time you have sex. Use an external condom, which goes on the penis. Or use an internal condom, which goes into the vagina or anus.  Make sure you use the right size external condom. A condom that's too small can break easily. A condom that's too big can slip off during sex.  Use a new condom each time you have sex. Be careful not to poke a hole in the condom when you open the wrapper.  Don't use an internal condom and an external condom at the same time.  Never use petroleum jelly (such as Vaseline), grease, hand lotion, baby oil, or anything with oil in it. These products can make holes in the condom.  After intercourse, hold the edge of the condom as you remove it. This will help keep semen from spilling out of the condom.  Do not have sex with anyone who has symptoms of an STI, such as sores on the genitals or mouth.  Do not drink a lot of alcohol or use drugs before sex.  Limit your sex partners. Sex with one partner who has sex only with you can reduce your risk of getting an STI.  Don't share sex toys. But if you do share them, use a condom and clean  "the sex toys between each use.  Talk to any partners before you have sex. Talk about what you feel comfortable with and whether you have any boundaries with sex. And find out if your partner or partners may be at risk for any STI. Keep in mind that a person may be able to spread an STI even if they do not have symptoms. You and any partners may want to get tested for STIs.  Where can you learn more?  Go to https://www.PSafe.net/patiented  Enter B608 in the search box to learn more about \"Safer Sex: Care Instructions.\"  Current as of: November 27, 2023               Content Version: 14.0    7524-7100 [employer].   Care instructions adapted under license by your healthcare professional. If you have questions about a medical condition or this instruction, always ask your healthcare professional. [employer] disclaims any warranty or liability for your use of this information.         "

## 2024-08-28 ENCOUNTER — MYC MEDICAL ADVICE (OUTPATIENT)
Dept: FAMILY MEDICINE | Facility: CLINIC | Age: 19
End: 2024-08-28
Payer: COMMERCIAL

## 2024-08-28 NOTE — TELEPHONE ENCOUNTER
Will route to Dr. Singletary (saw patient on 8/22/24) for physical.     Dr. Rushing has not seen patient in the office for over 2 years.     Rosa Lawton RN BSN  [facility]

## 2024-09-10 ENCOUNTER — OFFICE VISIT (OUTPATIENT)
Dept: FAMILY MEDICINE | Facility: CLINIC | Age: 19
End: 2024-09-10
Payer: COMMERCIAL

## 2024-09-10 VITALS
OXYGEN SATURATION: 100 % | HEART RATE: 84 BPM | RESPIRATION RATE: 17 BRPM | TEMPERATURE: 97.3 F | DIASTOLIC BLOOD PRESSURE: 76 MMHG | WEIGHT: 134 LBS | SYSTOLIC BLOOD PRESSURE: 114 MMHG | BODY MASS INDEX: 23.74 KG/M2 | HEIGHT: 63 IN

## 2024-09-10 DIAGNOSIS — Z12.4 SCREENING FOR MALIGNANT NEOPLASM OF CERVIX: Primary | ICD-10-CM

## 2024-09-10 DIAGNOSIS — Z11.3 ROUTINE SCREENING FOR STI (SEXUALLY TRANSMITTED INFECTION): ICD-10-CM

## 2024-09-10 PROCEDURE — 99213 OFFICE O/P EST LOW 20 MIN: CPT | Performed by: NURSE PRACTITIONER

## 2024-09-10 PROCEDURE — 87591 N.GONORRHOEAE DNA AMP PROB: CPT | Performed by: NURSE PRACTITIONER

## 2024-09-10 PROCEDURE — G0145 SCR C/V CYTO,THINLAYER,RESCR: HCPCS | Performed by: NURSE PRACTITIONER

## 2024-09-10 PROCEDURE — G0124 SCREEN C/V THIN LAYER BY MD: HCPCS | Performed by: PATHOLOGY

## 2024-09-10 PROCEDURE — 87491 CHLMYD TRACH DNA AMP PROBE: CPT | Performed by: NURSE PRACTITIONER

## 2024-09-10 RX ORDER — CITALOPRAM HYDROBROMIDE 20 MG/1
20 TABLET ORAL DAILY
COMMUNITY
Start: 2024-08-28

## 2024-09-10 ASSESSMENT — PAIN SCALES - GENERAL: PAINLEVEL: NO PAIN (0)

## 2024-09-10 NOTE — PROGRESS NOTES
"  Assessment & Plan     Screening for malignant neoplasm of cervix    - Pap Screen Only - Recommended Age 21 - 24 Years    Routine screening for STI (sexually transmitted infection)    - CHLAMYDIA TRACHOMATIS PCR  - NEISSERIA GONORRHOEA PCR          Subjective   Giuseppe is a 19 year old, presenting for the following health issues:  Gyn Exam (PAP )        9/10/2024     7:10 AM   Additional Questions   Roomed by Ever HUA LPN   Accompanied by Self         9/10/2024     7:10 AM   Patient Reported Additional Medications   Patient reports taking the following new medications Celexa 20mg     Here for cervical cancer and routine STI screening, no concerns, no symptoms. States periods are regular.     History of Present Illness       Reason for visit:  Pap smear lol   She is taking medications regularly.               Objective    /76   Pulse 84   Temp 97.3  F (36.3  C) (Tympanic)   Resp 17   Ht 1.6 m (5' 3\")   Wt 60.8 kg (134 lb)   LMP 08/28/2024 (Exact Date)   SpO2 100%   BMI 23.74 kg/m    Body mass index is 23.74 kg/m .  Physical Exam  Constitutional:       Appearance: Normal appearance.   HENT:      Right Ear: External ear normal.      Left Ear: External ear normal.   Eyes:      Pupils: Pupils are equal, round, and reactive to light.   Cardiovascular:      Rate and Rhythm: Normal rate.   Pulmonary:      Effort: Pulmonary effort is normal.   Genitourinary:     General: Normal vulva.      Labia:         Right: No rash, tenderness, lesion or injury.         Left: No rash, tenderness, lesion or injury.       Urethra: No prolapse or urethral swelling.      Vagina: Normal.      Cervix: Normal.   Skin:     General: Skin is warm and dry.   Neurological:      General: No focal deficit present.      Mental Status: She is alert and oriented to person, place, and time.   Psychiatric:         Mood and Affect: Mood normal.         Behavior: Behavior normal.         Thought Content: Thought content normal.         Judgment: " Judgment normal.                Signed Electronically by: ANTOINE Ruiz CNP

## 2024-09-11 LAB
C TRACH DNA SPEC QL NAA+PROBE: NEGATIVE
N GONORRHOEA DNA SPEC QL NAA+PROBE: NEGATIVE

## 2024-09-16 ENCOUNTER — PATIENT OUTREACH (OUTPATIENT)
Dept: FAMILY MEDICINE | Facility: CLINIC | Age: 19
End: 2024-09-16
Payer: COMMERCIAL

## 2024-09-16 PROBLEM — R87.612 PAPANICOLAOU SMEAR OF CERVIX WITH LOW GRADE SQUAMOUS INTRAEPITHELIAL LESION (LGSIL): Status: ACTIVE | Noted: 2024-09-10

## 2024-09-16 LAB
BKR LAB AP GYN ADEQUACY: ABNORMAL
BKR LAB AP GYN INTERPRETATION: ABNORMAL
BKR LAB AP HPV REFLEX: NO
BKR LAB AP PREVIOUS ABNORMAL: ABNORMAL
PATH REPORT.COMMENTS IMP SPEC: ABNORMAL
PATH REPORT.COMMENTS IMP SPEC: ABNORMAL
PATH REPORT.RELEVANT HX SPEC: ABNORMAL

## 2025-04-08 NOTE — PROGRESS NOTES
Assessment & Plan     Vaginal itching  Discussed wet prep and UA with micro with patient-treatment per below, await remaining results and treat if indicated.  - Wet preparation  - Chlamydia trachomatis PCR  - Neisseria gonorrhoeae PCR  - UA with Microscopic; Future  - Urine Culture; Future  - UA with Microscopic  - Urine Culture  - Urine Microscopic Exam    Yeast infection of the vagina  Discussed exam and test results. Will treat with 2 doses as symptoms have been present for 1 month. Return to clinic if symptoms persist.   - fluconazole (DIFLUCAN) 150 MG tablet; Take 1 tablet (150 mg) by mouth once for 1 dose. Repeat after 72 hours    Papanicolaou smear of cervix with low grade squamous intraepithelial lesion (LGSIL)   Discussed previous result with patient. Has been about 7 months since initial abnormal, discussed recommendation and rationale of waiting until about 1 year from initial screening. Discussed need for enough time to pass to allow resolution/change. Verbalizes understanding, will return to clinic in the fall.      Subjective   Giuseppe is a 20 year old, presenting for the following health issues:  Vaginal Itching    HPI        Vaginal Symptoms  Onset/Duration: 1 month ago  Description:  Vaginal Discharge: none   Itching (Pruritis): YES  Burning sensation:  YES  Odor: No  Accompanying Signs & Symptoms:  Urinary symptoms: YES- painful urination  Abdominal pain: No  Fever: No  History:   Sexually active: YES  New Partner: No  Possibility of Pregnancy:  No  Recent antibiotic use: No  Previous vaginitis issues: YES  Precipitating or alleviating factors: None  Therapies tried and outcome: none    Patient presents with 1 month of intermittent vaginal symptoms - denies vaginal discharge or odor, but noting vulvar and vaginal itching, burning. Has used some new products in this time. Occasional burning with urination. Denies hematuria, urinary urgency, frequency. Denies fever, chills, nausea. No changes in  "partners, use of hot tubs/pools/bubble baths.   Also wanted to follow up on abnormal pap smear from September-LSIL. Was her first pap smear.      Review of Systems  Constitutional, HEENT, cardiovascular, pulmonary, gi and gu systems are negative, except as otherwise noted.      Objective    /87 (BP Location: Right arm, Patient Position: Sitting, Cuff Size: Adult Regular)   Pulse 91   Ht 1.6 m (5' 3\")   Wt 68.9 kg (152 lb)   LMP 03/15/2025 (Exact Date)   SpO2 100%   BMI 26.93 kg/m    Body mass index is 26.93 kg/m .  Physical Exam   GENERAL: alert and no distress   (female): normal female external genitalia, normal urethral meatus , vaginal discharge - moderate and white, and normal cervix, adnexae, and uterus without masses.  MS: no gross musculoskeletal defects noted, no edema  SKIN: no suspicious lesions or rashes  PSYCH: mentation appears normal, affect normal/bright    Results for orders placed or performed in visit on 04/09/25 (from the past 24 hours)   Wet preparation    Specimen: Vagina; Swab   Result Value Ref Range    Trichomonas Absent Absent    Yeast Present (A) Absent    Clue Cells Absent Absent    WBCs/high power field 3+ (A) None   UA with Microscopic   Result Value Ref Range    Color Urine Yellow Colorless, Straw, Light Yellow, Yellow    Appearance Urine Clear Clear    Glucose Urine Negative Negative mg/dL    Bilirubin Urine Negative Negative    Ketones Urine Negative Negative mg/dL    Specific Gravity Urine <=1.005 1.003 - 1.035    Blood Urine Trace (A) Negative    pH Urine 6.0 5.0 - 7.0    Protein Albumin Urine Negative Negative mg/dL    Urobilinogen Urine 0.2 0.2, 1.0 E.U./dL    Nitrite Urine Negative Negative    Leukocyte Esterase Urine Negative Negative   Urine Microscopic Exam   Result Value Ref Range    RBC Urine None Seen 0-2 /HPF /HPF    WBC Urine 0-5 0-5 /HPF /HPF    Squamous Epithelials Urine Few (A) None Seen /LPF           Signed Electronically by: ANTOINE Raphael " CNP

## 2025-04-09 ENCOUNTER — OFFICE VISIT (OUTPATIENT)
Dept: OBGYN | Facility: CLINIC | Age: 20
End: 2025-04-09
Payer: COMMERCIAL

## 2025-04-09 VITALS
BODY MASS INDEX: 26.93 KG/M2 | OXYGEN SATURATION: 100 % | DIASTOLIC BLOOD PRESSURE: 87 MMHG | SYSTOLIC BLOOD PRESSURE: 131 MMHG | HEART RATE: 91 BPM | HEIGHT: 63 IN | WEIGHT: 152 LBS

## 2025-04-09 DIAGNOSIS — N89.8 VAGINAL ITCHING: Primary | ICD-10-CM

## 2025-04-09 DIAGNOSIS — B37.31 YEAST INFECTION OF THE VAGINA: ICD-10-CM

## 2025-04-09 LAB
ALBUMIN UR-MCNC: NEGATIVE MG/DL
APPEARANCE UR: CLEAR
BILIRUB UR QL STRIP: NEGATIVE
C TRACH DNA SPEC QL NAA+PROBE: NEGATIVE
CLUE CELLS: ABNORMAL
COLOR UR AUTO: YELLOW
GLUCOSE UR STRIP-MCNC: NEGATIVE MG/DL
HGB UR QL STRIP: ABNORMAL
KETONES UR STRIP-MCNC: NEGATIVE MG/DL
LEUKOCYTE ESTERASE UR QL STRIP: NEGATIVE
N GONORRHOEA DNA SPEC QL NAA+PROBE: NEGATIVE
NITRATE UR QL: NEGATIVE
PH UR STRIP: 6 [PH] (ref 5–7)
RBC #/AREA URNS AUTO: ABNORMAL /HPF
SP GR UR STRIP: <=1.005 (ref 1–1.03)
SPECIMEN TYPE: NORMAL
SPECIMEN TYPE: NORMAL
SQUAMOUS #/AREA URNS AUTO: ABNORMAL /LPF
TRICHOMONAS, WET PREP: ABNORMAL
UROBILINOGEN UR STRIP-ACNC: 0.2 E.U./DL
WBC #/AREA URNS AUTO: ABNORMAL /HPF
WBC'S/HIGH POWER FIELD, WET PREP: ABNORMAL
YEAST, WET PREP: PRESENT

## 2025-04-09 PROCEDURE — G2211 COMPLEX E/M VISIT ADD ON: HCPCS | Performed by: NURSE PRACTITIONER

## 2025-04-09 PROCEDURE — 87086 URINE CULTURE/COLONY COUNT: CPT | Performed by: NURSE PRACTITIONER

## 2025-04-09 PROCEDURE — 99214 OFFICE O/P EST MOD 30 MIN: CPT | Performed by: NURSE PRACTITIONER

## 2025-04-09 PROCEDURE — 81001 URINALYSIS AUTO W/SCOPE: CPT | Performed by: NURSE PRACTITIONER

## 2025-04-09 PROCEDURE — 87491 CHLMYD TRACH DNA AMP PROBE: CPT | Performed by: NURSE PRACTITIONER

## 2025-04-09 PROCEDURE — 87210 SMEAR WET MOUNT SALINE/INK: CPT | Performed by: NURSE PRACTITIONER

## 2025-04-09 PROCEDURE — 87591 N.GONORRHOEAE DNA AMP PROB: CPT | Performed by: NURSE PRACTITIONER

## 2025-04-09 PROCEDURE — 99459 PELVIC EXAMINATION: CPT | Performed by: NURSE PRACTITIONER

## 2025-04-09 RX ORDER — FLUCONAZOLE 150 MG/1
150 TABLET ORAL ONCE
Qty: 2 TABLET | Refills: 0 | Status: SHIPPED | OUTPATIENT
Start: 2025-04-09 | End: 2025-04-09

## 2025-04-09 NOTE — PATIENT INSTRUCTIONS
If you have any questions regarding your visit, Please contact your care team.     [employer] Services: [phone]  To Schedule an Appointment 24/7  Call: [phone][facility] [place] HOURS TELEPHONE NUMBER     Cassidy Analia- APRN CNP      Beulah Monge-RUCHI                   Monday 7:30am-2:00pm    Tuesday 7:30am-4:00pm    Wednesday 7:30am-2:00pm    Thursday 7:30am-11:00am    Friday 7:30am-2:00pm [address] [address]  Phone- [phone]   Fax- [phone]     Imaging Scheduling all locations  [phone]    [facility]  [address] [place] [address] [place] [address] [phone]         Urgent Care locations:  [facility]   Monday-Friday  10 am - 8 pm  Saturday and Sunday   9 am - 5 pm     [phone]   If you need a medication refill, please contact your pharmacy. Please allow 3 business days for your refill to be completed.  As always, Thank you for trusting us with your healthcare needs!      see additional instructions from your care team below

## 2025-04-10 LAB — BACTERIA UR CULT: NORMAL

## 2025-04-14 ENCOUNTER — OFFICE VISIT (OUTPATIENT)
Dept: FAMILY MEDICINE | Facility: CLINIC | Age: 20
End: 2025-04-14
Payer: COMMERCIAL

## 2025-04-14 VITALS
DIASTOLIC BLOOD PRESSURE: 66 MMHG | OXYGEN SATURATION: 99 % | BODY MASS INDEX: 25.95 KG/M2 | TEMPERATURE: 97.9 F | HEIGHT: 64 IN | SYSTOLIC BLOOD PRESSURE: 114 MMHG | RESPIRATION RATE: 16 BRPM | HEART RATE: 99 BPM | WEIGHT: 152 LBS

## 2025-04-14 DIAGNOSIS — Z00.00 ROUTINE GENERAL MEDICAL EXAMINATION AT A HEALTH CARE FACILITY: Primary | ICD-10-CM

## 2025-04-14 DIAGNOSIS — F33.1 MODERATE RECURRENT MAJOR DEPRESSION (H): ICD-10-CM

## 2025-04-14 DIAGNOSIS — E28.2 PCOS (POLYCYSTIC OVARIAN SYNDROME): ICD-10-CM

## 2025-04-14 DIAGNOSIS — R63.5 WEIGHT GAIN: ICD-10-CM

## 2025-04-14 PROBLEM — R45.851 PASSIVE SUICIDAL IDEATIONS: Status: RESOLVED | Noted: 2022-06-20 | Resolved: 2025-04-14

## 2025-04-14 PROBLEM — Z84.2 FAMILY HISTORY OF PCOS: Status: RESOLVED | Noted: 2019-12-23 | Resolved: 2025-04-14

## 2025-04-14 PROBLEM — F43.23 ADJUSTMENT DISORDER WITH MIXED ANXIETY AND DEPRESSED MOOD: Status: RESOLVED | Noted: 2019-12-23 | Resolved: 2025-04-14

## 2025-04-14 LAB
EST. AVERAGE GLUCOSE BLD GHB EST-MCNC: 97 MG/DL
HBA1C MFR BLD: 5 % (ref 0–5.6)

## 2025-04-14 PROCEDURE — 82607 VITAMIN B-12: CPT | Performed by: PHYSICIAN ASSISTANT

## 2025-04-14 PROCEDURE — 84443 ASSAY THYROID STIM HORMONE: CPT | Performed by: PHYSICIAN ASSISTANT

## 2025-04-14 PROCEDURE — 82306 VITAMIN D 25 HYDROXY: CPT | Performed by: PHYSICIAN ASSISTANT

## 2025-04-14 PROCEDURE — 99395 PREV VISIT EST AGE 18-39: CPT | Performed by: PHYSICIAN ASSISTANT

## 2025-04-14 PROCEDURE — 80048 BASIC METABOLIC PNL TOTAL CA: CPT | Performed by: PHYSICIAN ASSISTANT

## 2025-04-14 PROCEDURE — 36415 COLL VENOUS BLD VENIPUNCTURE: CPT | Performed by: PHYSICIAN ASSISTANT

## 2025-04-14 PROCEDURE — 99214 OFFICE O/P EST MOD 30 MIN: CPT | Mod: 25 | Performed by: PHYSICIAN ASSISTANT

## 2025-04-14 PROCEDURE — 83036 HEMOGLOBIN GLYCOSYLATED A1C: CPT | Performed by: PHYSICIAN ASSISTANT

## 2025-04-14 PROCEDURE — 82728 ASSAY OF FERRITIN: CPT | Performed by: PHYSICIAN ASSISTANT

## 2025-04-14 RX ORDER — DROSPIRENONE AND ETHINYL ESTRADIOL 0.02-3(28)
1 KIT ORAL DAILY
Qty: 84 TABLET | Refills: 0 | Status: SHIPPED | OUTPATIENT
Start: 2025-04-14

## 2025-04-14 SDOH — HEALTH STABILITY: PHYSICAL HEALTH: ON AVERAGE, HOW MANY DAYS PER WEEK DO YOU ENGAGE IN MODERATE TO STRENUOUS EXERCISE (LIKE A BRISK WALK)?: 5 DAYS

## 2025-04-14 SDOH — HEALTH STABILITY: PHYSICAL HEALTH: ON AVERAGE, HOW MANY MINUTES DO YOU ENGAGE IN EXERCISE AT THIS LEVEL?: 40 MIN

## 2025-04-14 ASSESSMENT — PAIN SCALES - GENERAL: PAINLEVEL_OUTOF10: NO PAIN (0)

## 2025-04-14 ASSESSMENT — SOCIAL DETERMINANTS OF HEALTH (SDOH)
HOW OFTEN DO YOU GET TOGETHER WITH FRIENDS OR RELATIVES?: TWICE A WEEK
HOW OFTEN DO YOU GET TOGETHER WITH FRIENDS OR RELATIVES?: TWICE A WEEK

## 2025-04-14 NOTE — PATIENT INSTRUCTIONS
Avoid paxil (paroxetine) for weight gain if possible    Ask if you can change to a birth control with estrogen in it not just progesterone          Preventive Care Advice   This is general advice given by our system to help you stay healthy. However, your care team may have specific advice just for you. Please talk to your care team about your preventive care needs.  Nutrition  Eat 5 or more servings of fruits and vegetables each day.  Try wheat bread, brown rice and whole grain pasta (instead of white bread, rice, and pasta).  Get enough calcium and vitamin D. Check the label on foods and aim for 100% of the RDA (recommended daily allowance).  Lifestyle  Exercise at least 150 minutes each week  (30 minutes a day, 5 days a week).  Do muscle strengthening activities 2 days a week. These help control your weight and prevent disease.  No smoking.  Wear sunscreen to prevent skin cancer.  Have a dental exam and cleaning every 6 months.  Yearly exams  See your health care team every year to talk about:  Any changes in your health.  Any medicines your care team has prescribed.  Preventive care, family planning, and ways to prevent chronic diseases.  Shots (vaccines)   HPV shots (up to age 26), if you've never had them before.  Hepatitis B shots (up to age 59), if you've never had them before.  COVID-19 shot: Get this shot when it's due.  Flu shot: Get a flu shot every year.  Tetanus shot: Get a tetanus shot every 10 years.  Pneumococcal, hepatitis A, and RSV shots: Ask your care team if you need these based on your risk.  Shingles shot (for age 50 and up)  General health tests  Diabetes screening:  Starting at age 35, Get screened for diabetes at least every 3 years.  If you are younger than age 35, ask your care team if you should be screened for diabetes.  Cholesterol test: At age 39, start having a cholesterol test every 5 years, or more often if advised.  Bone density scan (DEXA): At age 50, ask your care team if you  should have this scan for osteoporosis (brittle bones).  Hepatitis C: Get tested at least once in your life.  STIs (sexually transmitted infections)  Before age 24: Ask your care team if you should be screened for STIs.  After age 24: Get screened for STIs if you're at risk. You are at risk for STIs (including HIV) if:  You are sexually active with more than one person.  You don't use condoms every time.  You or a partner was diagnosed with a sexually transmitted infection.  If you are at risk for HIV, ask about PrEP medicine to prevent HIV.  Get tested for HIV at least once in your life, whether you are at risk for HIV or not.  Cancer screening tests  Cervical cancer screening: If you have a cervix, begin getting regular cervical cancer screening tests starting at age 21.  Breast cancer scan (mammogram): If you've ever had breasts, begin having regular mammograms starting at age 40. This is a scan to check for breast cancer.  Colon cancer screening: It is important to start screening for colon cancer at age 45.  Have a colonoscopy test every 10 years (or more often if you're at risk) Or, ask your provider about stool tests like a FIT test every year or Cologuard test every 3 years.  To learn more about your testing options, visit:   .  For help making a decision, visit:   https://bit.ly/rs64854.  Prostate cancer screening test: If you have a prostate, ask your care team if a prostate cancer screening test (PSA) at age 55 is right for you.  Lung cancer screening: If you are a current or former smoker ages 50 to 80, ask your care team if ongoing lung cancer screenings are right for you.  For informational purposes only. Not to replace the advice of your health care provider. Copyright   2023 [employer]. All rights reserved. Clinically reviewed by the [facility] Transitions Program. [employer] 355074 - REV 01/24.  Safer Sex: Care Instructions  Overview  Safer sex is a way to reduce your risk of  getting a sexually transmitted infection (STI). It can also help prevent pregnancy.  Several products can help you practice safer sex and reduce your chance of STIs. One of the best is a condom. There are internal and external condoms. You can use a special rubber sheet (dental dam) for protection during oral sex. Disposable gloves can keep your hands from touching blood, semen, or other body fluids that can carry infections.  Remember that birth control methods such as diaphragms, IUDs, foams, and birth control pills do not stop you from getting STIs.  Follow-up care is a key part of your treatment and safety. Be sure to make and go to all appointments, and call your doctor if you are having problems. It's also a good idea to know your test results and keep a list of the medicines you take.  How can you care for yourself at home?  Think about getting vaccinated to help prevent hepatitis A, hepatitis B, and human papillomavirus (HPV). They can be spread through sex.  Use a condom every time you have sex. Use an external condom, which goes on the penis. Or use an internal condom, which goes into the vagina or anus.  Make sure you use the right size external condom. A condom that's too small can break easily. A condom that's too big can slip off during sex.  Use a new condom each time you have sex. Be careful not to poke a hole in the condom when you open the wrapper.  Don't use an internal condom and an external condom at the same time.  Never use petroleum jelly (such as Vaseline), grease, hand lotion, baby oil, or anything with oil in it. These products can make holes in the condom.  After intercourse, hold the edge of the condom as you remove it. This will help keep semen from spilling out of the condom.  Do not have sex with anyone who has symptoms of an STI, such as sores on the genitals or mouth.  Do not drink a lot of alcohol or use drugs before sex.  Limit your sex partners. Sex with one partner who has sex  "only with you can reduce your risk of getting an STI.  Don't share sex toys. But if you do share them, use a condom and clean the sex toys between each use.  Talk to any partners before you have sex. Talk about what you feel comfortable with and whether you have any boundaries with sex. And find out if your partner or partners may be at risk for any STI. Keep in mind that a person may be able to spread an STI even if they do not have symptoms. You and any partners may want to get tested for STIs.  Where can you learn more?  Go to https://www.[employer].net/patiented  Enter B608 in the search box to learn more about \"Safer Sex: Care Instructions.\"  Current as of: April 30, 2024  Content Version: 14.4    7485-2709 [employer].   Care instructions adapted under license by your healthcare professional. If you have questions about a medical condition or this instruction, always ask your healthcare professional. [employer] disclaims any warranty or liability for your use of this information.       "

## 2025-04-14 NOTE — PROGRESS NOTES
"Preventive Care Visit  [facility] JAMIL Veliz PA-C, Family Medicine  Apr 14, 2025      Assessment & Plan     Routine general medical examination at a health care facility      Weight gain  Patient would like to change from progesterone only to combo pill  Will also check for diabetes, thyroid disease  Has pcos consider metformin in future  Work on lifestyle  - Hemoglobin A1c; Future  - Basic metabolic panel  (Ca, Cl, CO2, Creat, Gluc, K, Na, BUN); Future  - TSH with free T4 reflex; Future  - Vitamin D Deficiency; Future  - Hemoglobin A1c  - Basic metabolic panel  (Ca, Cl, CO2, Creat, Gluc, K, Na, BUN)  - TSH with free T4 reflex  - Vitamin D Deficiency    Denies suicidal or homicidal thoughts.  Patient instructed to go to the emergency room or call 911 if these occur.    Moderate recurrent major depression (H)  See below, she wants to wait until she goes back to her pcp in the [place] in a week to figure out next ssri, wants to continue weaning off zoloft  - Ferritin; Future  - Vitamin B12; Future  - sertraline (ZOLOFT) 50 MG tablet; Take 1 tablet (50 mg) by mouth daily.  - Ferritin  - Vitamin B12    PCOS (polycystic ovarian syndrome)  See above  - drospirenone-ethinyl estradiol (ZITA) 3-0.02 MG tablet; Take 1 tablet by mouth daily.  .edson          BMI  Estimated body mass index is 26.27 kg/m  as calculated from the following:    Height as of this encounter: 1.62 m (5' 3.78\").    Weight as of this encounter: 68.9 kg (152 lb).   Weight management plan: Discussed healthy diet and exercise guidelines    Counseling  Appropriate preventive services were addressed with this patient via screening, questionnaire, or discussion as appropriate for fall prevention, nutrition, physical activity, Tobacco-use cessation, social engagement, weight loss and cognition.  Checklist reviewing preventive services available has been given to the patient.  Reviewed patient's diet, addressing concerns and/or " questions.   The patient was instructed to see the dentist every 6 months.     Denies suicidal or homicidal thoughts.  Patient instructed to go to the emergency room or call 911 if these occur.          Subjective   Giuseppe is a 20 year old, presenting for the following:  Physical (Pt is not fasting)        4/14/2025    12:58 PM   Additional Questions   Roomed by Mary Carmen HERRERA CMA   Accompanied by n/a          HPI  - Would like to discuss the sertraline. Recently started weaning from 100mg to 50 mg as it wasnt really helping her mood symptoms. Usually goes to pcp in [place] so I have no records. Patient unsure what they Rx recently to start once off zoloft but she [place].     - Has been having rapid weight gain. Moved to  last year where she felt as though she was more active. Started taking both the sertraline (up to 100mg in January) and birth control (started in November) and has noticed weight gain.   [place] be going back to [place] until possibly September. [place]ying [place].   Has been on progesterone only.       - Pt has Hx of PCOS, and is wondering what she can do to manage this  Already seen by OBGYN for this.        Advance Care Planning  Patient does not have a Health Care Directive:       4/14/2025   General Health   How would you rate your overall physical health? (!) POOR   Feel stress (tense, anxious, or unable to sleep) Only a little   (!) STRESS CONCERN      4/14/2025   Nutrition   Three or more servings of calcium each day? (!) NO   Diet: Regular (no restrictions)   How many servings of fruit and vegetables per day? (!) 0-1   How many sweetened beverages each day? 0-1         4/14/2025   Exercise   Days per week of moderate/strenous exercise 5 days   Average minutes spent exercising at this level 40 min         4/14/2025   Social Factors   Frequency of gathering with friends or relatives Twice a week   Worry food won't last until get money to buy more Patient declined   Food not last or not have  enough money for food? Patient declined   Do you have housing? (Housing is defined as stable permanent housing and does not include staying ouside in a car, in a tent, in an abandoned building, in an overnight shelter, or couch-surfing.) Patient declined   Are you worried about losing your housing? Patient declined   Lack of transportation? Patient declined   Unable to get utilities (heat,electricity)? Patient declined         4/14/2025   Dental   Dentist two times every year? (!) NO           8/22/2024   TB Screening   Were you born outside of the [place]? No           Today's PHQ-2 Score:       4/14/2025    12:47 PM   PHQ-2 ( 1999 [employer])   Q1: Little interest or pleasure in doing things 0   Q2: Feeling down, depressed or hopeless 1   PHQ-2 Score 1    Q1: Little interest or pleasure in doing things Not at all   Q2: Feeling down, depressed or hopeless Several days   PHQ-2 Score 1       Patient-reported           4/14/2025   Substance Use   Alcohol more than 3/day or more than 7/wk No   Do you use any other substances recreationally? No     Social History     Tobacco Use    Smoking status: Former     Types: Cigarettes     Passive exposure: Past    Smokeless tobacco: Never    Tobacco comments:     Quit smoking October 2024, started smoking May 2024   Vaping Use    Vaping status: Former   Substance Use Topics    Alcohol use: Not Currently    Drug use: Never           4/14/2025   STI Screening   New sexual partner(s) since last STI/HIV test? (!) YES      History of abnormal Pap smear: YES - reflected in Problem List and Health Maintenance accordingly        9/10/2024    10:41 AM   PAP / HPV   PAP Low-grade squamous intraepithelial lesion (LSIL) encompassing HPV/mild dysplasia/CIN1            4/14/2025   Contraception/Family Planning   Questions about contraception or family planning (!) YES         Reviewed and updated as needed this visit by Provider                         Objective    Exam  /66   Pulse 99   Temp  "97.9  F (36.6  C) (Temporal)   Resp 16   Ht 1.62 m (5' 3.78\")   Wt 68.9 kg (152 lb)   LMP 03/15/2025 (Exact Date)   SpO2 99%   BMI 26.27 kg/m     Estimated body mass index is 26.27 kg/m  as calculated from the following:    Height as of this encounter: 1.62 m (5' 3.78\").    Weight as of this encounter: 68.9 kg (152 lb).    Physical Exam  GENERAL: alert and no distress  EYES: Eyes grossly normal to inspection, PERRL and conjunctivae and sclerae normal  HENT: ear canals and TM's normal, nose and mouth without ulcers or lesions  NECK: no adenopathy, no asymmetry, masses, or scars  RESP: lungs clear to auscultation - no rales, rhonchi or wheezes  CV: regular rate and rhythm, normal S1 S2, no S3 or S4, no murmur, click or rub, no peripheral edema  ABDOMEN: soft, nontender, no hepatosplenomegaly, no masses and bowel sounds normal  MS: no gross musculoskeletal defects noted, no edema  SKIN: no suspicious lesions or rashes  NEURO: Normal strength and tone, mentation intact and speech normal  PSYCH: mentation appears normal, affect normal/bright        Signed Electronically by: Katie Veliz PA-C    "

## 2025-04-15 LAB
ANION GAP SERPL CALCULATED.3IONS-SCNC: 10 MMOL/L (ref 7–15)
BUN SERPL-MCNC: 9.9 MG/DL (ref 6–20)
CALCIUM SERPL-MCNC: 9.4 MG/DL (ref 8.8–10.4)
CHLORIDE SERPL-SCNC: 109 MMOL/L (ref 98–107)
CREAT SERPL-MCNC: 0.56 MG/DL (ref 0.51–0.95)
EGFRCR SERPLBLD CKD-EPI 2021: >90 ML/MIN/1.73M2
FERRITIN SERPL-MCNC: 23 NG/ML (ref 6–175)
GLUCOSE SERPL-MCNC: 84 MG/DL (ref 70–99)
HCO3 SERPL-SCNC: 22 MMOL/L (ref 22–29)
POTASSIUM SERPL-SCNC: 4.5 MMOL/L (ref 3.4–5.3)
SODIUM SERPL-SCNC: 141 MMOL/L (ref 135–145)
TSH SERPL DL<=0.005 MIU/L-ACNC: 2.39 UIU/ML (ref 0.3–4.2)
VIT B12 SERPL-MCNC: 313 PG/ML (ref 232–1245)
VIT D+METAB SERPL-MCNC: 12 NG/ML (ref 20–50)

## 2025-04-15 NOTE — RESULT ENCOUNTER NOTE
Feliciano Chin,       Your recent test results are attached, if you have any questions or concerns please feel free to contact me via e-mail or call [phone].  Sodium and potassium normal. Blood sugar (glucose) normal.  Creatinine and GFR normal, which means kidney function is normal.     Chloride is fine. Vitamin d is low please start over the counter D3 2,000 units daily. Recheck labs 3 months or whenever you come back from your travels. thyroid is normal. Ferritin or iron storage is normal. B12 normal. A1c normal no diabetes.        It was a pleasure to see you at your recent office visit.      Sincerely,  Katie Veliz PA-C

## 2025-06-11 ENCOUNTER — VIRTUAL VISIT (OUTPATIENT)
Dept: FAMILY MEDICINE | Facility: CLINIC | Age: 20
End: 2025-06-11
Payer: COMMERCIAL

## 2025-06-11 DIAGNOSIS — N89.8 VAGINAL ITCHING: Primary | ICD-10-CM

## 2025-06-11 PROCEDURE — 98005 SYNCH AUDIO-VIDEO EST LOW 20: CPT | Performed by: FAMILY MEDICINE

## 2025-06-11 NOTE — PROGRESS NOTES
Giuseppe is a 20 year old who is being evaluated via a billable video visit.    How would you like to obtain your AVS? MyChart  If the video visit is dropped, the invitation should be resent by: Text to cell phone: [phone]  Will anyone else be joining your video visit? No      Assessment & Plan     Giuseppe was seen today for recheck.    Diagnoses and all orders for this visit:    Vaginal itching  -     Wet prep - lab collect; Future    Will notify her with results.   Recommended to schedule a face to face visit for the other additional concerns with physical symptoms of mouth sores & lesions. Patient verbalized understanding.      Return in about 1 week (around 6/18/2025), or if symptoms worsen or fail to improve.      Subjective   Giuseppe is a 20 year old, presenting for the following health issues:  RECHECK        6/11/2025     2:48 PM   Additional Questions   Roomed by MP         6/11/2025     2:48 PM   Patient Reported Additional Medications   Patient reports taking the following new medications None per patient     HPI      Vaginal symptoms-    Vaginal Symptoms  Onset: 2-3 months  Description:  Vaginal Discharge: none   Itching (Pruritis): YES  Burning sensation:  no   Odor: no   Accompanying Signs & Symptoms:  Pain with Urination: no   Abdominal Pain: no   Fever: no   History:   Sexually active: YES  New Partner: no   Possibility of Pregnancy:  No  Precipitating factors:   Recent Antibiotic Use: no   Alleviating factors:  Recently seen by OBGYN in 4/2025- treated with Diflucan and symptoms went away. But came back.     Therapies Tried and outcome: Diflucan a couple of months ago- symptoms seem to be back.     Recent STD testing- for Chlamydia and gonorrhea were negative.     Also reports other physical symptoms to include mouth ulcers with swollen gums, mough pain, pimple patches for the past  2 years and vesicular lesions on her hands for the past 5 years.     Was recently seen for her Annual Physical on 4/14/2025  but she didn't bring up these concerns.     She's a student in [employer] and plans to leave in 11 days.         Review of Systems  Constitutional, HEENT, cardiovascular, pulmonary, gi and gu systems are negative, except as otherwise noted.      Objective           Vitals:  No vitals were obtained today due to virtual visit.    Physical Exam   GENERAL: alert and no distress  EYES: Eyes grossly normal to inspection.  No discharge or erythema, or obvious scleral/conjunctival abnormalities.  RESP: No audible wheeze, cough, or visible cyanosis.    SKIN: Due to the video platform- exam is limited.   NEURO: Cranial nerves grossly intact.  Mentation and speech appropriate for age.  PSYCH: Appropriate affect, tone, and pace of words          Video-Visit Details    Type of service:  Video Visit   Originating Location (pt. Location): Home  Distant Location (provider location):  On-site  Platform used for Video Visit: Gagandeep  Signed Electronically by: Lisbeth Rushing MD

## 2025-06-12 DIAGNOSIS — F33.1 MODERATE RECURRENT MAJOR DEPRESSION (H): ICD-10-CM

## 2025-06-19 ENCOUNTER — LAB (OUTPATIENT)
Dept: LAB | Facility: CLINIC | Age: 20
End: 2025-06-19
Payer: COMMERCIAL

## 2025-06-19 DIAGNOSIS — N89.8 VAGINAL ITCHING: ICD-10-CM

## 2025-06-19 LAB
CLUE CELLS: NORMAL
TRICHOMONAS, WET PREP: NORMAL
WBC'S/HIGH POWER FIELD, WET PREP: NORMAL
YEAST, WET PREP: NORMAL

## 2025-08-20 ENCOUNTER — PATIENT OUTREACH (OUTPATIENT)
Dept: FAMILY MEDICINE | Facility: CLINIC | Age: 20
End: 2025-08-20
Payer: COMMERCIAL

## 2025-08-20 PROBLEM — R87.612 PAPANICOLAOU SMEAR OF CERVIX WITH LOW GRADE SQUAMOUS INTRAEPITHELIAL LESION (LGSIL): Status: ACTIVE | Noted: 2024-09-10
